# Patient Record
(demographics unavailable — no encounter records)

---

## 2024-11-20 NOTE — PROCEDURE
[FreeTextEntry1] : PAP study showed various CPAP and one bppa pressure had little sleep on different pressure no obvious central apnea some hypopneas on cpap presures  previous data reviewed:  HST 7/2024 ahi 44/hr  desat 63%  detailed download for 1 week shows some cheyne pina respiration & some centrals when leak is higher    compliance data reviewed : 3/2024-6/2024 % days used :  97% avg use > 4 hours: 96% avg use: 7 hrs & 43  min avg pressure 9 cmh20 avg AHI 6 minimal leak.   6/2024 normal spiroemtry

## 2024-11-20 NOTE — PHYSICAL EXAM
[No Acute Distress] : no acute distress [Well Nourished] : well nourished [Well Developed] : well developed [IV] : Mallampati Class: IV [No Resp Distress] : no resp distress [No Acc Muscle Use] : no acc muscle use [Clear to Auscultation Bilaterally] : clear to auscultation bilaterally [Kyphosis] : kyphosis [TextBox_140] : resting tremor

## 2024-11-20 NOTE — HISTORY OF PRESENT ILLNESS
[Never] : never [TextBox_4] : DESI GAMBOA is a 79 year old male who presents with joselyn history and to review PAP study results   diagnose with joselyn > 5 years ago has cpap machine with him- reports its old so we repeated HST to get him a new device   HST 7/2024 ahi 44/hr  desat 63%   underwent PAP study 9/2024  had central apnea/  on the downloads for cpap machine did PAP study to see if he has any Central apnea

## 2025-04-09 NOTE — ADDENDUM
[FreeTextEntry1] : This note was written by Lawrence Chen on 04/09/2025 acting as scribe for Dr. Javier Johnson M.D.   I, Dr. Javier Johnson, have read and attest that all the information, medical decision making and discharge instructions within are true and accurate.

## 2025-04-09 NOTE — CONSULT LETTER
[Dear  ___] : Dear  [unfilled], [Consult Letter:] : I had the pleasure of evaluating your patient, [unfilled]. [Please see my note below.] : Please see my note below. [Consult Closing:] : Thank you very much for allowing me to participate in the care of this patient.  If you have any questions, please do not hesitate to contact me. [Sincerely,] : Sincerely, [FreeTextEntry2] : ESTELLA WAYNE

## 2025-04-09 NOTE — DISCUSSION/SUMMARY
[de-identified] : Discussed at length the nature of the patients condition. Their left knee symptoms appear secondary to degenerative arthritis. At this point, I do not think that surgery is warranted. Therefore, we will continue nonoperatively. We will schedule for left knee viscosupplementation injections. In the interim, for the acute pain patient was given a left knee cortisone injection today as detailed above for symptomatic relief. He was given a prescription for PT. I also suggested that he continue with Voltaren gel and Tylenol as he is unable to take anti-inflammatory medications since he is on Eliquis.  His right knee arthritis is currently minimally symptomatic at this time.  I believe that the pain in his left thigh is related to his lower back and suggested that he f/u with Dr. Nielsen and Dr. Montoya at pain management for that.  I will see him back in 3-4 months with x-ray of both knees.

## 2025-04-09 NOTE — PHYSICAL EXAM
[de-identified] : General appearance: well-nourished and hydrated, pleasant, alert and oriented x 3, cooperative. HEENT: Normocephalic, EOM intact, Nasal septum midline, Oral cavity clear, External auditory canal clear. Cardiovascular: no apparent abnormalities, lower leg edema, no varicosities, pedal pulses are palpable. Lymphatics Lymph nodes: none palpated, Lymphedema: not present. Neurologic: sensation is normal, no muscle weakness in upper or lower extremities, patella tendon reflexes intact. Dermatologic no apparent skin lesions, moist, warm, no rash. Spine: cervical spine appears normal and moves freely, thoracic spine appears normal and moves freely, lumbosacral spine appears normal and moves freely. Gait: nonantalgic. Tremor of upper extremities.   Left knee Inspection: trace effusion Wounds: none. Alignment: normal. Palpation: medial and peripatellar tenderness on palpation. ROM active (in degrees): 0-130 with crepitus and some discomfort at extremes. Ligamentous laxity: all ligaments appear stable, negative ant. drawer test, negative post. drawer test, stable to varus stress test, stable to valgus stress test. negative Lachman's test, negative pivot shift test Meniscal Test: negative McMurrays, negative Kirstie. Patellofemoral Alignment Test: Q angle-, normal. Muscle Test: good quad strength. Leg examination: calf is soft and non-tender.   Right knee Inspection: no effusion or erythema. Wounds: none. Alignment: normal. Palpation: no specific tenderness on palpation. ROM active (in degrees): 0-120 with no instability. Ligamentous laxity: all ligaments appear stable, negative ant. drawer test, negative post. drawer test, stable to varus stress test, stable to valgus stress test. negative Lachman's test, negative pivot shift test Meniscal Test: negative McMurrays, negative Kirstie. Patellofemoral Alignment Test: Q angle-, normal. Muscle Test: good quad strength. Leg examination: calf is soft and non-tender. [de-identified] : Left knee x-rays, standing AP/Lateral and Merchant films, and 45-degree PA standing view, taken at the office today shows normal alignment, mild narrowing of the lateral compartment, patella at appropriate height and central position, patellofemoral joint space narrowing especially at the lateral facet, small osteophytes, Kellgren and Sudheer grade 1 with patellofemoral arthritis  Right knee x-ray merchant view taken at the office today demonstrates joint space narrowing, osteophytes, and a well centered patella with patellofemoral arthritis

## 2025-04-09 NOTE — HISTORY OF PRESENT ILLNESS
[de-identified] : DESI GAMBOA 80 year old male presents for follow-up evaluation of new issue with left knee acute pain for about 1 month and denies injuries. He states that the pain is diffuse and comes and goes with some buckling sensation but denies swelling, clicking, and LOM. The pt states that he can walk as much as he needs to but on stairs, he holds railing both ways and takes it one step at a time. He uses Voltaren gel for the knee to help with the Sx or Tylenol. The pt also takes Eliquis for a stroke 4 yrs ago and cannot take anti-inflammatory medications. He also sees Dr. Nielsen for lower back pain and was prescribed gabapentin but is currently not taking it. His right knee was treated last yr which is doing fine and would like to discuss his left knee Sx and Tx.

## 2025-04-09 NOTE — PROCEDURE
[de-identified] : LEFT KNEE CORTISONE INJECTION Discussed at length with the patient the planned steroid and lidocaine injection. The risks, benefits, convalescence and alternatives were reviewed. The possible side effects discussed included but were not limited to: pain, swelling, heat and redness. These symptoms are generally mild but if they are extensive then contact the office. Giving pain relievers by mouth such as NSAIDs or Tylenol can generally treat the reactions to steroid and lidocaine. Rare cases of infection have been noted. Rash, hives and itching may occur post injection. If you have muscle pain or cramps, flushing and or swelling of the face, rapid heart beat, nausea, dizziness, fever, chills, headache, difficulty breathing, swelling in the arms or legs, or have a prickly feeling of your skin, contact a health care provider immediately.   Following this discussion, the knee was prepped with betadine and under sterile conditions 5 cc of 2% lidocaine and 1 cc depo-medrol (40mg) were injected with a 21 gauge needle. The needle was introduced into the joint, aspiration was performed to ensure intra-articular placement and the medication was injected. Upon withdrawal of the needle the site was cleaned with alcohol and a bandaid applied. The patient tolerated the injection well and there were no adverse effects. Post injection instructions included no strenuous activity for 24 hours, cryotherapy and if there are any adverse effects to contact the office.

## 2025-04-25 NOTE — PROCEDURE
[de-identified] : Euflexxa # 1 left knee Discussed at length with the patient the planned Euflexxa injection. The risks, benefits, convalescence and alternatives were reviewed. The possible side effects discussed included but were not limited to: pain, swelling, heat and redness. There symptoms are generally mild but if they are extensive then contact the office. Giving pain relievers by mouth such as NSAIDs or Tylenol can generally treat the reactions to Euflexxa. Rare cases of infection have been noted. Rash, hives and itching may occur post injection. If you have muscle pain or cramps, flushing and or swelling of the face, rapid heart beat, nausea, dizziness, fever, chills, headache, difficulty breathing, swelling in the arms or legs, or have a prickly feeling of your skin, contact a health care provider immediately.  Following this discussion, the knee was prepped with betadine and under sterile condition the Euflexxa injection was performed with a 22 gauge needle. The needle was introduced into the joint, aspiration was performed to ensure intra-articular placement and the medication was injected. Upon withdrawal of the needle the site was cleaned with alcohol and a bandaid applied. The patient tolerated the injection well and there were no adverse effects. Post injection instructions included no strenuous activity for 24 hours, cryotherapy and if there are any adverse effects to contact the office.

## 2025-04-25 NOTE — PROCEDURE
[de-identified] : Euflexxa # 1 left knee Discussed at length with the patient the planned Euflexxa injection. The risks, benefits, convalescence and alternatives were reviewed. The possible side effects discussed included but were not limited to: pain, swelling, heat and redness. There symptoms are generally mild but if they are extensive then contact the office. Giving pain relievers by mouth such as NSAIDs or Tylenol can generally treat the reactions to Euflexxa. Rare cases of infection have been noted. Rash, hives and itching may occur post injection. If you have muscle pain or cramps, flushing and or swelling of the face, rapid heart beat, nausea, dizziness, fever, chills, headache, difficulty breathing, swelling in the arms or legs, or have a prickly feeling of your skin, contact a health care provider immediately.  Following this discussion, the knee was prepped with betadine and under sterile condition the Euflexxa injection was performed with a 22 gauge needle. The needle was introduced into the joint, aspiration was performed to ensure intra-articular placement and the medication was injected. Upon withdrawal of the needle the site was cleaned with alcohol and a bandaid applied. The patient tolerated the injection well and there were no adverse effects. Post injection instructions included no strenuous activity for 24 hours, cryotherapy and if there are any adverse effects to contact the office.

## 2025-05-02 NOTE — PROCEDURE
[de-identified] : Euflexxa # 2 Left knee Discussed at length with the patient the planned Euflexxa injection. The risks, benefits, convalescence and alternatives were reviewed. The possible side effects discussed included but were not limited to: pain, swelling, heat and redness. There symptoms are generally mild but if they are extensive then contact the office. Giving pain relievers by mouth such as NSAIDs or Tylenol can generally treat the reactions to Euflexxa. Rare cases of infection have been noted. Rash, hives and itching may occur post injection. If you have muscle pain or cramps, flushing and or swelling of the face, rapid heart beat, nausea, dizziness, fever, chills, headache, difficulty breathing, swelling in the arms or legs, or have a prickly feeling of your skin, contact a health care provider immediately.  Following this discussion, the knee was prepped with betadine and under sterile condition the Euflexxa injection was performed with a 22 gauge needle. The needle was introduced into the joint, aspiration was performed to ensure intra-articular placement and the medication was injected. Upon withdrawal of the needle the site was cleaned with alcohol and a bandaid applied. The patient tolerated the injection well and there were no adverse effects. Post injection instructions included no strenuous activity for 24 hours, cryotherapy and if there are any adverse effects to contact the office.

## 2025-05-09 NOTE — PROCEDURE
[de-identified] : Euflexxa # 3 Left Knee Discussed at length with the patient the planned Euflexxa injection. The risks, benefits, convalescence and alternatives were reviewed. The possible side effects discussed included but were not limited to: pain, swelling, heat and redness. There symptoms are generally mild but if they are extensive then contact the office. Giving pain relievers by mouth such as NSAIDs or Tylenol can generally treat the reactions to Euflexxa. Rare cases of infection have been noted. Rash, hives and itching may occur post injection. If you have muscle pain or cramps, flushing and or swelling of the face, rapid heart beat, nausea, dizziness, fever, chills, headache, difficulty breathing, swelling in the arms or legs, or have a prickly feeling of your skin, contact a health care provider immediately.  Following this discussion, the knee was prepped with betadine and under sterile condition the Euflexxa injection was performed with a 22 gauge needle. The needle was introduced into the joint, aspiration was performed to ensure intra-articular placement and the medication was injected. Upon withdrawal of the needle the site was cleaned with alcohol and a bandaid applied. The patient tolerated the injection well and there were no adverse effects. Post injection instructions included no strenuous activity for 24 hours, cryotherapy and if there are any adverse effects to contact the office.

## 2025-05-09 NOTE — PROCEDURE
[de-identified] : Euflexxa # 3 Left Knee Discussed at length with the patient the planned Euflexxa injection. The risks, benefits, convalescence and alternatives were reviewed. The possible side effects discussed included but were not limited to: pain, swelling, heat and redness. There symptoms are generally mild but if they are extensive then contact the office. Giving pain relievers by mouth such as NSAIDs or Tylenol can generally treat the reactions to Euflexxa. Rare cases of infection have been noted. Rash, hives and itching may occur post injection. If you have muscle pain or cramps, flushing and or swelling of the face, rapid heart beat, nausea, dizziness, fever, chills, headache, difficulty breathing, swelling in the arms or legs, or have a prickly feeling of your skin, contact a health care provider immediately.  Following this discussion, the knee was prepped with betadine and under sterile condition the Euflexxa injection was performed with a 22 gauge needle. The needle was introduced into the joint, aspiration was performed to ensure intra-articular placement and the medication was injected. Upon withdrawal of the needle the site was cleaned with alcohol and a bandaid applied. The patient tolerated the injection well and there were no adverse effects. Post injection instructions included no strenuous activity for 24 hours, cryotherapy and if there are any adverse effects to contact the office.

## 2025-05-13 NOTE — PHYSICAL EXAM
[General Appearance - Alert] : alert [General Appearance - In No Acute Distress] : in no acute distress [General Appearance - Well Nourished] : well nourished [Oriented To Time, Place, And Person] : oriented to person, place, and time [Impaired Insight] : insight and judgment were intact [Affect] : the affect was normal [Mood] : the mood was normal [Motor Tone] : muscle tone was normal in all four extremities [Motor Strength] : muscle strength was normal in all four extremities [Involuntary Movements] : no involuntary movements were seen [Limited Balance] : the patient's balance was impaired [Tremor] : a tremor present [] : no respiratory distress [Exaggerated Use Of Accessory Muscles For Inspiration] : no accessory muscle use [Heart Rate And Rhythm] : heart rate was normal and rhythm regular [Abdomen Soft] : soft [No Spinal Tenderness] : no spinal tenderness [Nail Clubbing] : no clubbing  or cyanosis of the fingernails [Skin Color & Pigmentation] : normal skin color and pigmentation [Skin Turgor] : normal skin turgor [Sclera] : the sclera and conjunctiva were normal [Outer Ear] : the ears and nose were normal in appearance [FreeTextEntry8] : Tremor grading: mild right rest tremor, postural with arms extended:1-3 cm right, 0-1 cm left, winged: 3-5 cm right, 1 cm left, finger to nose: 3-5 right,  0-1 cm on the left  [Able to toe walk] : the patient was not able to toe walk [Able to heel walk] : the patient was not able to heel walk [FreeTextEntry1] : unable to tandem walk or stand; can stand with feet together side by side

## 2025-05-13 NOTE — REASON FOR VISIT
[New Patient Visit] : a new patient visit [Referred By: _________] : Patient was referred by PETE [Other: _____] : [unfilled]

## 2025-05-13 NOTE — HISTORY OF PRESENT ILLNESS
[de-identified] : The patient reports he has been suffering from tremor in his right hand/arm for over 15 years. Tremors has gradual progressed over the years. and it is increased with movement, stress and anxiety but occurs at rest also. He does not drink caffeine or alcohol. He takes Primadone 50 mg 2-3 tab twice a day but it has not been helping. Symptom interferes with his ADLs, writing, eating and holding something. He also has difficulty with balance but denied any falls.  He denied any side effects. He has been following with Dr. Luis Hart, neurologist in the past 3 years.   Of note, he is taking Eliquis with history of Stroke

## 2025-05-13 NOTE — ASSESSMENT
[FreeTextEntry1] : The patient reports he has been suffering from tremor in his right hand/arm for over 15 years. Tremors has gradual progressed over the years. and it is increased with movement, stress and anxiety but occurs at rest also. Symptom interferes with his ADLs, writing, eating and holding something. He also has difficulty with balance but denied any falls.  He denied any side effects. He has been following with Dr. Luis Hart, neurologist in the past 3 years.   Discussed MRI guided focused ultrasound therapy including the procedure, risks, and benefits in office today.  I have discussed this patients symptoms with them. His tremor significantly interferes with his quality of life.   High intensity focused ultrasound is a procedure that occurs in an MRI magnet, where a series of MRIs are performed and the target is identified and ultrasound therapy is targeted at identified area. The head is completely shaved prior to a frame being placed. Then a rubber bag of circulating cool water is fitted to the head to prevent thermal injury to the scalp, and improve the interface between the transducer and the scalp. Subthreshold sonications are delivered so benefit and side effect can be assessed prior to creating a permanent lesion. Many series of assessments will be performed during the procedure. Lesions are irreversible. FDA approved for unilateral treatment.  This is procedure, not a surgery, not performed under anesthesia.  Side effects are possible including tingling around face on one side or around lips or hand. That may persist up to several months after procedure, and can persist. May be faint to severe. May attenuate over a year following procedure. Weakness possible in addition to paresthesia. Speech disturbances/dysarthria are possible. Side effects may chino over time. Very common to have unsteady gait after procedure. You will be discharged with a walker as a fall precaution. Efficacy of tremor suppression wanes after 1 year and attenuates over time. It may return sooner in some patients. Small chance pt may not have tremor benefit from procedure. May need another procedure in the future.  Plan:  -CT head with HIFU protocol - Refer to movement neurology